# Patient Record
Sex: MALE | Race: WHITE | NOT HISPANIC OR LATINO | URBAN - METROPOLITAN AREA
[De-identification: names, ages, dates, MRNs, and addresses within clinical notes are randomized per-mention and may not be internally consistent; named-entity substitution may affect disease eponyms.]

---

## 2024-01-03 ENCOUNTER — ALLIED HEALTH (OUTPATIENT)
Dept: INTEGRATIVE MEDICINE | Facility: CLINIC | Age: 34
End: 2024-01-03
Payer: COMMERCIAL

## 2024-01-03 DIAGNOSIS — M79.10 MYALGIA: ICD-10-CM

## 2024-01-03 DIAGNOSIS — M99.04 SEGMENTAL AND SOMATIC DYSFUNCTION OF SACRAL REGION: ICD-10-CM

## 2024-01-03 DIAGNOSIS — M99.03 SEGMENTAL AND SOMATIC DYSFUNCTION OF LUMBAR REGION: Primary | ICD-10-CM

## 2024-01-03 DIAGNOSIS — M99.02 SEGMENTAL AND SOMATIC DYSFUNCTION OF THORACIC REGION: ICD-10-CM

## 2024-01-03 DIAGNOSIS — M54.59 MECHANICAL LOW BACK PAIN: ICD-10-CM

## 2024-01-03 DIAGNOSIS — M25.562 ARTHRALGIA OF BOTH KNEES: ICD-10-CM

## 2024-01-03 DIAGNOSIS — M25.561 ARTHRALGIA OF BOTH KNEES: ICD-10-CM

## 2024-01-03 PROCEDURE — 98941 CHIROPRACT MANJ 3-4 REGIONS: CPT | Performed by: CHIROPRACTOR

## 2024-01-03 PROCEDURE — 99203 OFFICE O/P NEW LOW 30 MIN: CPT | Performed by: CHIROPRACTOR

## 2024-01-03 NOTE — PROGRESS NOTES
"Subjective   Patient ID: Gaetano Thomas Jr. is a 33 y.o. male who presents today, January 3, 2024 for a new patient evaluation and treatment of low back pain.    (1/15) [facility]    Chiropractic Medicine HPI:  Provacative factors include : bending, sitting, standing, walking, and lifting  Palliative factors incude : heat supplements, topical pain relievers  Pain is described as : dull   Previous treatment for complaint has included : heat supplements, topical pain relievers, chiropractic care  Patient denies : bladder weakness bowel weakness catching clicking grinding popping radiating pain into the distal extremity trauma  Intensity of the pain: Patient rates most severe pain at 7/10 on a numerical pain scale  Oswestry Disability Index has been completed: yes     Initial exam:   Gaetano Thomas presents for evaluation and treatment of chronic low back pain. He states that his symptoms started when he was 20 years old (13 years ago). He recalls that he was playing basketball at the time but does not recall a specific event that occurred. He states that he has had previous chiropractic care, which seemed to temporarily help manage his symptoms. However, he now reports that his symptoms seem to be worsening. He states that he tends to feel \"off balance\" and that he also experiences knee pain (L>>R) that he believes to be related to his chief complaint. He states that his symptoms are constant which has made work consistently difficult. He finds that he is able to work for about 6 months before his symptoms become too severe for him to physically continue working. He reports that he has noticed a burning sensation within his joints and any movements (ie. standing, lifting, walking, sitting, bending, etc.) aggravate his symptoms.       Objective   Review of Systems   Constitutional: Negative.    HENT: Negative.     Eyes: Negative.    Respiratory: Negative.     Cardiovascular: Negative.    Gastrointestinal: Negative.  "   Musculoskeletal:  Positive for back pain, gait problem and myalgias.   Neurological:  Positive for numbness.   Hematological: Negative.    Psychiatric/Behavioral: Negative.     All other systems reviewed and are negative.    Physical Exam  Neurological:      General: No focal deficit present.      Mental Status: He is alert and oriented to person, place, and time.      Cranial Nerves: No dysarthria or facial asymmetry.      Sensory: Sensation is intact.      Motor: Motor function is intact.      Coordination: Coordination is intact.      Gait: Gait abnormal.        Spine Musculoskeletal Exam    Inspection    Leg length disparity: left greater than right    Palpation    Thoracolumbar    Tenderness: present      Gluteal: left      SI Joint: left      Spinous process: mid lumbar    Right      Masses: none      Spasms: none      Crepitus: none      Muscle tone: normal    Left      Masses: none      Spasms: none      Crepitus: none      Muscle tone: normal    Range of Motion    Thoracolumbar       Flexion: normal. Flexion detail: no pain.     Extension: 50%. Extension detail: pain.       Right      Lateral bendin%. Lateral bending detail: pain.       Lateral rotation: 75%. Lateral rotation detail: pain.       Left      Lateral bendin%. Lateral bending detail: pain.       Lateral rotation: 75%. Lateral rotation detail: pain.      Strength    Thoracolumbar    Thoracolumbar motor exam is normal.       General    Neurological: alert       Other Orthopedic Tests:  Thoracic/Lumbar/Sacrum: Right Steele's Lumbar positive and with local pain.  Left Steele's Lumbar positive and with local pain.  Right Yeoman's positive and with local pain.  Left Yeoman's positive and with local pain.  Right Nachlas' positive and with local pain.  Left Nachlas' positive and with local pain.    Segmental Joint(s): Segmental joint dysfunction was assessed with motion palpation and is identified in the following areas:  Lumbopelvic / Sacral  SIJ : L2, L4, L5/S1, R SIJ, and L SIJ      Assessment/Plan   Today's Treatment Included: Chiropractic manipulation to the  Segmental Joint(s) Lumbopelvic/Sacral SIJ : L2, L4, L5/S1, R SIJ, and L SIJ Segmental Joint(s) Thoracic : T5, T6, and T10   Treatment Techniques Used : Pelvic drop table technique and Manual traction  Pin and stretch  Integrative Dry Needling (IDN) - Needles in / out:  4.    Soft-tissue mobilization was performed in the following areas: (7 mins.)    Thoracic Paraspinal mm. bilateral  Lumbar Paraspinal mm. bilateral, Gluteal mm. Glute. Max.  and Glute. Med. bilateral, and Piriformis bilateral            Treatment Plan:   The patient and I discussed the risks and benefits of Chiropractic Care. Consent for care was given both written and orally by the patient.    Based on the patient's subjective complaints along with the examination findings, it is advised that a course of Chiropractic Treatment by initiated in the form of:   Chiropractic Manipulation (CMT)  Integrative Dry Needing (IDN)    Chiropractic treatment recommended at a frequency of 1 times per week for 4 weeks in conjunction with other providers and treatment modalities.    The goals of the treatment will be to: decrease pain, increase activity, increase range of motion, reduce lower back pain, improve quality of life, improve the ability to stand, decrease muscular hypertonicity, increase functional capacity, and improve postural strength    The patient tolerated today's treatment with little or no additional discomfort and was instructed to contact the office for questions or concerns.     I have placed orders for the following:  Rheumatology follow up    Follow up as scheduled.

## 2024-01-17 ASSESSMENT — ENCOUNTER SYMPTOMS
NUMBNESS: 1
RESPIRATORY NEGATIVE: 1
BACK PAIN: 1
CONSTITUTIONAL NEGATIVE: 1
HEMATOLOGIC/LYMPHATIC NEGATIVE: 1
CARDIOVASCULAR NEGATIVE: 1
MYALGIAS: 1
GASTROINTESTINAL NEGATIVE: 1
EYES NEGATIVE: 1
PSYCHIATRIC NEGATIVE: 1